# Patient Record
Sex: FEMALE | Race: WHITE | NOT HISPANIC OR LATINO | URBAN - METROPOLITAN AREA
[De-identification: names, ages, dates, MRNs, and addresses within clinical notes are randomized per-mention and may not be internally consistent; named-entity substitution may affect disease eponyms.]

---
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
## 2024-03-13 ENCOUNTER — OFFICE VISIT (OUTPATIENT)
Dept: PEDIATRICS | Facility: CLINIC | Age: 19
End: 2024-03-13
Payer: COMMERCIAL

## 2024-03-13 VITALS
SYSTOLIC BLOOD PRESSURE: 115 MMHG | WEIGHT: 139.1 LBS | BODY MASS INDEX: 21.08 KG/M2 | HEART RATE: 86 BPM | HEIGHT: 68 IN | DIASTOLIC BLOOD PRESSURE: 73 MMHG

## 2024-03-13 DIAGNOSIS — Z00.129 ENCOUNTER FOR ROUTINE CHILD HEALTH EXAMINATION WITHOUT ABNORMAL FINDINGS: Primary | ICD-10-CM

## 2024-03-13 DIAGNOSIS — R13.10 DYSPHAGIA, UNSPECIFIED TYPE: ICD-10-CM

## 2024-03-13 DIAGNOSIS — F41.1 GAD (GENERALIZED ANXIETY DISORDER): ICD-10-CM

## 2024-03-13 PROCEDURE — 99395 PREV VISIT EST AGE 18-39: CPT | Performed by: PEDIATRICS

## 2024-03-13 PROCEDURE — 1036F TOBACCO NON-USER: CPT | Performed by: PEDIATRICS

## 2024-03-13 RX ORDER — ESCITALOPRAM OXALATE 10 MG/1
10 TABLET ORAL DAILY
Qty: 30 TABLET | Refills: 2 | Status: SHIPPED | OUTPATIENT
Start: 2024-03-13 | End: 2024-04-22 | Stop reason: SDUPTHER

## 2024-03-13 NOTE — PROGRESS NOTES
"Subjective   Patient presents alone  Ericka Darling is a 18 y.o. female who is here for this well-child visit.    General health- Ericka Darling is overall in good health.  Medical problems include JOSE.    Updates from previous visit:    Stopped zoloft a few months ago- tells me it helped dramatically with anxiety but made her sad.  Would like to try a different SSRI    Current Issues:  Current concerns include none.    Social and Family: There are no changes in child's social and family history    Review of Nutrition:  Current diet: no dairy  There are not restrictions in patients diet  Constipation? No    Sleep:  Sleep: all night, no daytime naps    Education:  School performance:   [employer]    Exercise:  Patient participates in regular exercise    Screening Questions:  Risk factors for alcohol/drug use:  no  Smoking/Vaping- no     Menstruation:  Periods are regular    Sexual activity:  Sexually active? no - dates girls and boys    Mental Health:  concerns for Mental Health- still with anxiety    Objective   /73   Pulse 86   Ht 1.721 m (5' 7.75\")   Wt 63.1 kg (139 lb 1.6 oz)   BMI 21.31 kg/m²   Growth parameters are noted and are appropriate for age.  General:   alert and oriented, in no acute distress   Gait:   normal   Skin:   normal   Oral cavity:   lips, mucosa, and tongue normal; teeth and gums normal   Eyes:   sclerae white, pupils equal and reactive   Ears:   normal bilaterally   Neck:   no adenopathy and thyroid not enlarged, symmetric, no tenderness/mass/nodules   Lungs:  clear to auscultation bilaterally   Heart:   regular rate and rhythm, S1, S2 normal, no murmur, click, rub or gallop   Abdomen:  soft, non-tender; bowel sounds normal; no masses, no organomegaly   :  normal external genitalia, no erythema, no discharge   Extremities:  extremities normal, warm and well-perfused; no cyanosis, clubbing, or edema, negative forward bend   Neuro:  normal without focal findings and muscle tone and " strength normal and symmetric     Assessment/Plan   Well Young Adult- Healthy weight- JOSE  1. The patient was counseled regarding nutrition and physical activity.  2. Vaccines are up to date  3. Follow up in 1 year for next well child exam or sooner with concerns.    4. STD screening deferred  5. Screening labs 2023 reviewed  6. GYN at 20  7. Lexapro 10mg- pt to call in 1 month with update in sxs

## 2024-04-22 DIAGNOSIS — F41.1 GAD (GENERALIZED ANXIETY DISORDER): ICD-10-CM

## 2024-04-22 RX ORDER — ESCITALOPRAM OXALATE 10 MG/1
10 TABLET ORAL DAILY
Qty: 30 TABLET | Refills: 2 | Status: SHIPPED | OUTPATIENT
Start: 2024-04-22 | End: 2024-07-21

## 2024-05-02 ENCOUNTER — TELEPHONE (OUTPATIENT)
Dept: PEDIATRICS | Facility: CLINIC | Age: 19
End: 2024-05-02

## 2024-05-02 PROCEDURE — 88305 TISSUE EXAM BY PATHOLOGIST: CPT | Performed by: DERMATOLOGY

## 2024-05-03 ENCOUNTER — LAB REQUISITION (OUTPATIENT)
Dept: DERMATOPATHOLOGY | Facility: CLINIC | Age: 19
End: 2024-05-03
Payer: COMMERCIAL

## 2024-05-03 DIAGNOSIS — L98.9 DISORDER OF THE SKIN AND SUBCUTANEOUS TISSUE, UNSPECIFIED: ICD-10-CM

## 2024-05-06 LAB
LABORATORY COMMENT REPORT: NORMAL
PATH REPORT.FINAL DX SPEC: NORMAL
PATH REPORT.GROSS SPEC: NORMAL
PATH REPORT.MICROSCOPIC SPEC OTHER STN: NORMAL
PATH REPORT.RELEVANT HX SPEC: NORMAL
PATH REPORT.TOTAL CANCER: NORMAL

## 2024-05-11 ENCOUNTER — OFFICE VISIT (OUTPATIENT)
Dept: PEDIATRICS | Facility: CLINIC | Age: 19
End: 2024-05-11
Payer: COMMERCIAL

## 2024-05-11 VITALS
HEART RATE: 98 BPM | DIASTOLIC BLOOD PRESSURE: 79 MMHG | SYSTOLIC BLOOD PRESSURE: 119 MMHG | WEIGHT: 140.6 LBS | BODY MASS INDEX: 21.54 KG/M2

## 2024-05-11 DIAGNOSIS — L70.9 ACNE, UNSPECIFIED ACNE TYPE: Primary | ICD-10-CM

## 2024-05-11 PROBLEM — F41.1 GENERALIZED ANXIETY DISORDER: Status: ACTIVE | Noted: 2024-05-11

## 2024-05-11 LAB — PREGNANCY TEST URINE, POC: NEGATIVE

## 2024-05-11 PROCEDURE — 81025 URINE PREGNANCY TEST: CPT | Performed by: PEDIATRICS

## 2024-05-11 PROCEDURE — 99213 OFFICE O/P EST LOW 20 MIN: CPT | Performed by: PEDIATRICS

## 2024-05-11 NOTE — PROGRESS NOTES
Subjective   Patient ID: Ericka Darling is a 18 y.o. female who presents for Contraception.  Today she is accompanied by accompanied by self .     HPI    Derm recommended Accutane for patient  Will not give RX unless pt is on OCP  Pt not wild about taking OCP  Plans to stop once course of Accutane is complete    No migraines  No blood clots  NO DUB  No tobacco or nicotine    LMP was 2 weeks ago  Dates girls and boys  Not currently sexually active      Review of systems negative unless otherwise indicated in HPI    Objective   /79   Pulse 98   Wt 63.8 kg (140 lb 9.6 oz)   BMI 21.54 kg/m²     Physical Exam  General: alert, active, in no acute distress    Assessment/Plan   Problem List Items Addressed This Visit    None  Visit Diagnoses       Acne, unspecified acne type    -  Primary    Relevant Medications    norgestrel-ethinyl estradioL (Low-ogestrel,Cryselle) 0.3-30 mg-mcg tablet    Other Relevant Orders    POCT pregnancy, urine manually resulted (Completed)          Pt starting Accutane for acne- needs birth control method with on Accutane  Start OCP- use, expectations, side effects reviewed  HCG neg    Marni Rey MD

## 2024-05-11 NOTE — PATIENT INSTRUCTIONS
Oral Contraceptive Pills    You should start your oral contraceptive pill on the Sunday following the first day of your next period.  It is recommended that the pill be taken at the same time each day.  3-6 months from starting the pill your periods will be very timely, shorter, and lighter.  You should have improvements in symptoms of cramps and acne in that time frame.        Side effects include:  Nausea  Breast tenderness  Weight gain  Headaches  Mood swings  Vaginal bleeding  Blood clots  This is a potentially serious side effect but is extremely rare.  It is more common in smokers and in patients with a family history of clotting disorders.

## 2024-09-16 DIAGNOSIS — F41.1 GAD (GENERALIZED ANXIETY DISORDER): ICD-10-CM

## 2024-09-16 RX ORDER — ESCITALOPRAM OXALATE 10 MG/1
10 TABLET ORAL DAILY
Qty: 30 TABLET | Refills: 5 | Status: SHIPPED | OUTPATIENT
Start: 2024-09-16 | End: 2024-09-17

## 2024-09-17 DIAGNOSIS — F41.1 GAD (GENERALIZED ANXIETY DISORDER): ICD-10-CM

## 2024-09-17 RX ORDER — ESCITALOPRAM OXALATE 10 MG/1
10 TABLET ORAL DAILY
Qty: 30 TABLET | Refills: 5 | Status: SHIPPED | OUTPATIENT
Start: 2024-09-17

## 2024-10-11 DIAGNOSIS — F41.1 GAD (GENERALIZED ANXIETY DISORDER): ICD-10-CM

## 2024-10-11 RX ORDER — ESCITALOPRAM OXALATE 10 MG/1
10 TABLET ORAL DAILY
Qty: 90 TABLET | Refills: 2 | Status: SHIPPED | OUTPATIENT
Start: 2024-10-11

## 2024-11-08 DIAGNOSIS — L70.9 ACNE, UNSPECIFIED ACNE TYPE: Primary | ICD-10-CM

## 2024-11-08 RX ORDER — NORGESTIMATE AND ETHINYL ESTRADIOL 7DAYSX3 28
1 KIT ORAL DAILY
Qty: 28 TABLET | Refills: 2 | Status: SHIPPED | OUTPATIENT
Start: 2024-11-08 | End: 2025-11-08

## 2025-01-11 DIAGNOSIS — L70.9 ACNE, UNSPECIFIED ACNE TYPE: ICD-10-CM

## 2025-01-13 RX ORDER — NORGESTREL AND ETHINYL ESTRADIOL 0.3-0.03MG
1 KIT ORAL DAILY
Qty: 84 TABLET | Refills: 2 | Status: SHIPPED | OUTPATIENT
Start: 2025-01-13

## 2025-03-14 RX ORDER — DESONIDE 0.5 MG/G
OINTMENT TOPICAL
COMMUNITY
Start: 2024-08-15

## 2025-03-14 RX ORDER — ISOTRETINOIN 30 MG/1
CAPSULE ORAL
COMMUNITY
Start: 2024-12-17

## 2025-03-17 ENCOUNTER — APPOINTMENT (OUTPATIENT)
Dept: PEDIATRICS | Facility: CLINIC | Age: 20
End: 2025-03-17
Payer: COMMERCIAL

## 2025-03-17 VITALS
BODY MASS INDEX: 21.63 KG/M2 | SYSTOLIC BLOOD PRESSURE: 118 MMHG | HEART RATE: 73 BPM | HEIGHT: 68 IN | DIASTOLIC BLOOD PRESSURE: 79 MMHG | WEIGHT: 142.7 LBS

## 2025-03-17 DIAGNOSIS — L70.9 ACNE, UNSPECIFIED ACNE TYPE: ICD-10-CM

## 2025-03-17 DIAGNOSIS — R51.9 NONINTRACTABLE HEADACHE, UNSPECIFIED CHRONICITY PATTERN, UNSPECIFIED HEADACHE TYPE: ICD-10-CM

## 2025-03-17 DIAGNOSIS — Z00.00 WELL ADULT EXAM: Primary | ICD-10-CM

## 2025-03-17 DIAGNOSIS — F41.1 GENERALIZED ANXIETY DISORDER: ICD-10-CM

## 2025-03-17 PROCEDURE — 99213 OFFICE O/P EST LOW 20 MIN: CPT | Performed by: STUDENT IN AN ORGANIZED HEALTH CARE EDUCATION/TRAINING PROGRAM

## 2025-03-17 PROCEDURE — 3008F BODY MASS INDEX DOCD: CPT | Performed by: STUDENT IN AN ORGANIZED HEALTH CARE EDUCATION/TRAINING PROGRAM

## 2025-03-17 PROCEDURE — 99395 PREV VISIT EST AGE 18-39: CPT | Performed by: STUDENT IN AN ORGANIZED HEALTH CARE EDUCATION/TRAINING PROGRAM

## 2025-03-17 NOTE — PROGRESS NOTES
Subjective   Ericka is a 19 y.o. female presenting today for well child check.  Overall doing well.    Follows with dermatology for acne. Now just taking topical tretinoin.  Anxiety - doing well on lexapro 10 mg.  On OCPs    Concerns today: Headaches for the last month or so. Headaches do not wake her from sleep. Mostly happen during the day. Eats regular meals, drinks a lot of water, consistent sleep schedule.   Not sure what brings on the headaches.   Ericka is a [employer] . Has been looking at computer screens all day every day from 8th grade, nothing new.   Takes an iron supplement every morning    Nutrition: balanced diet  Elimination: no issues  Sleep: adequate  School: in college, on an internship semester  Physical Activity: adequate  Drugs/Alcohol/Tobacco Use: none  Relationships/Sexual History: not dating  Menstrual Status: no issues    Mental health: doing well on lexapro.   PHQ-9 negative    Sports Participation Screening:  Pre-sports participation survey questions assessed and passed? Yes     Objective   Physical Exam  Constitutional:       Appearance: Normal appearance. She is normal weight.   HENT:      Head: Normocephalic and atraumatic.      Right Ear: Tympanic membrane normal.      Left Ear: Tympanic membrane normal.      Nose: Nose normal.      Mouth/Throat:      Mouth: Mucous membranes are moist.   Eyes:      Extraocular Movements: Extraocular movements intact.      Conjunctiva/sclera: Conjunctivae normal.      Pupils: Pupils are equal, round, and reactive to light.   Cardiovascular:      Rate and Rhythm: Normal rate and regular rhythm.      Heart sounds: Normal heart sounds.   Pulmonary:      Breath sounds: Normal breath sounds.   Abdominal:      General: Abdomen is flat. Bowel sounds are normal.      Palpations: Abdomen is soft.   Genitourinary:     Rectum: Normal.   Musculoskeletal:         General: Normal range of motion.      Cervical back: Normal range of motion and neck supple.    Skin:     General: Skin is warm and dry.   Neurological:      General: No focal deficit present.      Mental Status: She is alert and oriented to person, place, and time. Mental status is at baseline.         Assessment/Plan   Healthy 19 y.o. female child.  Acne - follows with dermatology  Mental health - doing well on lexapro  Headaches - no red flag symptoms on history or physical exam. discussed lifestyle modifications (already doing well with hydration, sleep, diet). Will obtain routine blood work today, although discussed that this is unlike to give answer. Discussed reasons to obtain more urgent evaluation with imaging (waking from sleep, headaches relieved by vomiting), referral placed to neurology.   Discussed nutrition, physical activity, limited screen time, healthy relationships. Immunizations: up to date  Follow-up visit in 1 year or earlier if there are any concerns.

## 2025-03-18 LAB
25(OH)D3+25(OH)D2 SERPL-MCNC: 30 NG/ML (ref 30–100)
CHOLEST SERPL-MCNC: 165 MG/DL
CHOLEST/HDLC SERPL: 3.3 (CALC)
ERYTHROCYTE [DISTWIDTH] IN BLOOD BY AUTOMATED COUNT: 11.6 % (ref 11–15)
HCT VFR BLD AUTO: 41.4 % (ref 35–45)
HDLC SERPL-MCNC: 50 MG/DL
HGB BLD-MCNC: 13.7 G/DL (ref 11.7–15.5)
LDLC SERPL CALC-MCNC: 85 MG/DL (CALC)
MCH RBC QN AUTO: 31.5 PG (ref 27–33)
MCHC RBC AUTO-ENTMCNC: 33.1 G/DL (ref 32–36)
MCV RBC AUTO: 95.2 FL (ref 80–100)
NONHDLC SERPL-MCNC: 115 MG/DL (CALC)
PLATELET # BLD AUTO: 329 THOUSAND/UL (ref 140–400)
PMV BLD REES-ECKER: 9.3 FL (ref 7.5–12.5)
RBC # BLD AUTO: 4.35 MILLION/UL (ref 3.8–5.1)
TRIGL SERPL-MCNC: 210 MG/DL
WBC # BLD AUTO: 7.5 THOUSAND/UL (ref 3.8–10.8)

## 2025-07-01 DIAGNOSIS — F41.1 GAD (GENERALIZED ANXIETY DISORDER): ICD-10-CM

## 2025-07-02 RX ORDER — ESCITALOPRAM OXALATE 10 MG/1
10 TABLET ORAL DAILY
Qty: 90 TABLET | Refills: 1 | Status: SHIPPED | OUTPATIENT
Start: 2025-07-02